# Patient Record
Sex: FEMALE | URBAN - METROPOLITAN AREA
[De-identification: names, ages, dates, MRNs, and addresses within clinical notes are randomized per-mention and may not be internally consistent; named-entity substitution may affect disease eponyms.]

---

## 2018-07-31 ENCOUNTER — OFFICE VISIT (OUTPATIENT)
Dept: URBAN - METROPOLITAN AREA CLINIC 23 | Facility: CLINIC | Age: 78
End: 2018-07-31
Payer: COMMERCIAL

## 2018-07-31 DIAGNOSIS — E11.9 TYPE 2 DIABETES MELLITUS W/O COMPLICATION: ICD-10-CM

## 2018-07-31 PROCEDURE — 99213 OFFICE O/P EST LOW 20 MIN: CPT | Performed by: OPHTHALMOLOGY

## 2018-07-31 PROCEDURE — 92134 CPTRZ OPH DX IMG PST SGM RTA: CPT | Performed by: OPHTHALMOLOGY

## 2018-07-31 ASSESSMENT — INTRAOCULAR PRESSURE
OD: 17
OS: 18

## 2018-07-31 NOTE — IMPRESSION/PLAN
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 OS. Condition: stable. Plan: Discussed diagnosis in detail with patient. No treatment is required at this time. Recommend pt to continue  AREDS 2 formula. Wear quality sunglasses and monitor vision at home with [address] [place] [address] Road. Call the office for an immediate appointment if [address] worsens. OCT performed today: no IRF or SRF OS- stable.

## 2018-07-31 NOTE — IMPRESSION/PLAN
Impression: Vitreous degeneration, bilateral: H43.813. OU. Condition: stable. Vision: vision not affected. Plan: Posterior vitreous detachment accounts for the patient's complaints. There is no evidence of retinal pathology. All signs and risks of retinal detachment and tears were discussed in detail. Patient instructed to call office immediately if any symptoms noted.

## 2018-07-31 NOTE — IMPRESSION/PLAN
Impression: Type 2 diabetes mellitus w/o complication: J96.4. OU. Condition: stable. Plan: Discussed diagnosis in detail with patient. No treatment is required at this time based on exam and OCT. Will continue to observe condition and or symptoms. Emphasized blood sugar control.

## 2018-07-31 NOTE — IMPRESSION/PLAN
Impression: Exdtve age-rel mclr degn, right eye, with inact chrdl neovas: H35.3212. OD. Condition: established, stable. Vision: vision affected. Last ROGER Tx OD 11/09/2017 w/ Dr Nereida Kaur Plan: Discussed diagnosis in detail with patient. No treatment is required at this time based on exam and OCT. Recommend observation for now. Will reassess condition in 4 months. OCT shows no IRF or SRF - stable See Dr Roth or Dr Kendra Lezama for lid eval - Elasticity

## 2018-08-21 ENCOUNTER — OFFICE VISIT (OUTPATIENT)
Dept: URBAN - METROPOLITAN AREA CLINIC 23 | Facility: CLINIC | Age: 78
End: 2018-08-21
Payer: COMMERCIAL

## 2018-08-21 PROCEDURE — 99213 OFFICE O/P EST LOW 20 MIN: CPT | Performed by: OPTOMETRIST

## 2018-08-21 RX ORDER — AZELASTINE HYDROCHLORIDE 0.5 MG/ML
0.05 % SOLUTION/ DROPS OPHTHALMIC
Qty: 1 | Refills: 11 | Status: INACTIVE
Start: 2018-08-21 | End: 2018-09-17

## 2018-08-21 RX ORDER — PREDNISOLONE ACETATE 10 MG/ML
1 % SUSPENSION/ DROPS OPHTHALMIC
Qty: 1 | Refills: 0 | Status: INACTIVE
Start: 2018-08-21 | End: 2018-08-27

## 2018-08-21 RX ORDER — NEOMYCIN, POLYMYXIN B SULFATES, DEXAMETHASONE 1; 3.5; 1 MG/G; MG/G; [USP'U]/G
OINTMENT OPHTHALMIC
Qty: 1 | Refills: 1 | Status: INACTIVE
Start: 2018-08-21 | End: 2018-09-17

## 2018-09-17 ENCOUNTER — OFFICE VISIT (OUTPATIENT)
Dept: URBAN - METROPOLITAN AREA CLINIC 23 | Facility: CLINIC | Age: 78
End: 2018-09-17
Payer: COMMERCIAL

## 2018-09-17 DIAGNOSIS — H10.45 OTHER CHRONIC ALLERGIC CONJUNCTIVITIS: Primary | ICD-10-CM

## 2018-09-17 DIAGNOSIS — H04.123 DRY EYE SYNDROME OF BILATERAL LACRIMAL GLANDS: ICD-10-CM

## 2018-09-17 PROCEDURE — 99213 OFFICE O/P EST LOW 20 MIN: CPT | Performed by: OPTOMETRIST

## 2018-09-17 NOTE — IMPRESSION/PLAN
Impression: Other chronic allergic conjunctivitis: H10.45 OU. Plan: Discussed condition with pt. Improving. Pt to continue lid hygene and using AT frequently.

## 2018-10-12 ENCOUNTER — OFFICE VISIT (OUTPATIENT)
Dept: URBAN - METROPOLITAN AREA CLINIC 23 | Facility: CLINIC | Age: 78
End: 2018-10-12
Payer: COMMERCIAL

## 2018-10-12 DIAGNOSIS — H16.223 KERATOCONJUNCT SICCA, NOT SPECIFIED AS SJOGREN'S, BILATERAL: Primary | ICD-10-CM

## 2018-10-12 PROCEDURE — 99212 OFFICE O/P EST SF 10 MIN: CPT | Performed by: OPTOMETRIST

## 2018-10-12 RX ORDER — LIFITEGRAST 50 MG/ML
5 % SOLUTION/ DROPS OPHTHALMIC
Qty: 60 | Refills: 3 | Status: ACTIVE
Start: 2018-10-12

## 2018-10-12 ASSESSMENT — KERATOMETRY
OD: 43.38
OS: 45.00

## 2018-10-12 ASSESSMENT — INTRAOCULAR PRESSURE
OS: 22
OD: 16

## 2018-10-12 NOTE — IMPRESSION/PLAN
Impression: Keratoconjunct sicca, not specified as Sjogren's, bilateral: P61.586. Plan: Examination findings were discussed. Dry eyes can be a chronic problem. Artificial tears are recommended throughout the day. Gel at bedtime may be helpful. Environmental factors can be modified.

## 2018-11-20 ENCOUNTER — OFFICE VISIT (OUTPATIENT)
Dept: URBAN - METROPOLITAN AREA CLINIC 23 | Facility: CLINIC | Age: 78
End: 2018-11-20
Payer: MEDICARE

## 2018-11-20 DIAGNOSIS — H35.3212 EXDTVE AGE-REL MCLR DEGN, RIGHT EYE, WITH INACT CHRDL NEOVAS: Primary | ICD-10-CM

## 2018-11-20 DIAGNOSIS — H35.3123 NEXDTVE AGE-REL MCLR DEGN, L EYE, ADV ATRPC W/O SBFVL INVL: ICD-10-CM

## 2018-11-20 DIAGNOSIS — H43.813 VITREOUS DEGENERATION, BILATERAL: ICD-10-CM

## 2018-11-20 PROCEDURE — 99213 OFFICE O/P EST LOW 20 MIN: CPT | Performed by: OPHTHALMOLOGY

## 2018-11-20 PROCEDURE — 92134 CPTRZ OPH DX IMG PST SGM RTA: CPT | Performed by: OPHTHALMOLOGY

## 2018-11-20 ASSESSMENT — INTRAOCULAR PRESSURE
OS: 16
OD: 15

## 2018-11-20 NOTE — IMPRESSION/PLAN
Impression: Type 2 diabetes mellitus w/o complication: S36.6. OU. Condition: stable. Plan: Discussed diagnosis in detail with patient. No treatment is required at this time based on exam and OCT. Will continue to observe condition and or symptoms. Emphasized blood sugar control.

## 2018-11-20 NOTE — IMPRESSION/PLAN
Impression: Exdtve age-rel mclr degn, right eye, with inact chrdl neovas: H35.3212. OD. Condition: established, stable. Vision: vision affected. Last ROGER Tx OD 11/09/2017 w/ Dr Hai Pastrana Plan: Discussed diagnosis in detail with patient. No treatment is required at this time based on exam and OCT. Recommend observation for now. Will reassess condition in 4 months. OCT shows no IRF or SRF - stable Recommend patient schedule appointment with Dr. Tania Harper for [address] [place] [address]

## 2018-11-20 NOTE — IMPRESSION/PLAN
Impression: Nexdtve age-rel mclr degn, l eye, adv atrpc w/o sbfvl invl: H37.0552. OS. Condition: stable. Plan: Discussed diagnosis in detail with patient. No treatment is required at this time. Recommend pt to continue  AREDS 2 formula. Wear quality sunglasses and monitor vision at home with [address] [place] [address] Road. Call the office for an immediate appointment if [address] worsens. OCT performed today: no IRF or SRF OS- stable.

## 2019-03-18 ENCOUNTER — OFFICE VISIT (OUTPATIENT)
Dept: URBAN - METROPOLITAN AREA CLINIC 23 | Facility: CLINIC | Age: 79
End: 2019-03-18
Payer: MEDICARE

## 2019-03-18 PROCEDURE — 99213 OFFICE O/P EST LOW 20 MIN: CPT | Performed by: OPHTHALMOLOGY

## 2019-03-18 PROCEDURE — 92134 CPTRZ OPH DX IMG PST SGM RTA: CPT | Performed by: OPHTHALMOLOGY

## 2019-03-18 ASSESSMENT — INTRAOCULAR PRESSURE
OS: 15
OD: 14

## 2019-03-18 NOTE — IMPRESSION/PLAN
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 OS. Condition: stable. Plan: Discussed diagnosis in detail with patient. No treatment is required at this time. Recommend pt to continue  AREDS 2 formula. Wear quality sunglasses and monitor vision at home with [address] [place] [address] Road. Call the office for an immediate appointment if [address] worsens. OCT performed today: no IRF or SRF OS - stable.

## 2019-03-18 NOTE — IMPRESSION/PLAN
Impression: Type 2 diabetes mellitus w/o complication: M83.2. OU. Condition: stable. Plan: Discussed diagnosis in detail with patient. No treatment is required at this time based on exam and OCT. Will continue to observe condition and or symptoms. Emphasized blood sugar control.

## 2019-03-18 NOTE — IMPRESSION/PLAN
Impression: Exdtve age-rel mclr degn, right eye, with inact chrdl neovas: H35.3212. OD. Condition: established, stable. Vision: vision affected. Last ROGER Tx OD 11/09/2017 w/ Dr Simón Lamar Plan: Discussed diagnosis in detail with patient. No treatment is required at this time based on exam and OCT. Recommend observation for now. Will reassess condition in 4 months. OCT shows no IRF or SRF - stable.

## 2019-08-21 ENCOUNTER — OFFICE VISIT (OUTPATIENT)
Dept: URBAN - METROPOLITAN AREA CLINIC 23 | Facility: CLINIC | Age: 79
End: 2019-08-21
Payer: MEDICARE

## 2019-08-21 PROCEDURE — 99213 OFFICE O/P EST LOW 20 MIN: CPT | Performed by: OPHTHALMOLOGY

## 2019-08-21 PROCEDURE — 92134 CPTRZ OPH DX IMG PST SGM RTA: CPT | Performed by: OPHTHALMOLOGY

## 2019-08-21 ASSESSMENT — INTRAOCULAR PRESSURE
OS: 23
OD: 16

## 2019-08-21 NOTE — IMPRESSION/PLAN
Impression: Exdtve age-rel mclr degn, right eye, with inact chrdl neovas: H35.3212. OD. Condition: established, stable. Vision: vision affected. Last ROGER Tx OD 11/09/2017 w/ Dr Esteban Valverde Plan: Discussed diagnosis in detail with patient. No treatment is required at this time based on exam and OCT. Recommend observation for now. Will reassess condition in 5 months.  OCT shows no change from last scan 3/18/19

## 2019-08-21 NOTE — IMPRESSION/PLAN
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 OS. Condition: stable. Plan: Discussed diagnosis in detail with patient. No treatment is required at this time. Recommend pt to continue  AREDS 2 formula. Wear quality sunglasses and monitor vision at home with [address] [place] [address] Road. Call the office for an immediate appointment if [address] worsens. OCT performed today: no IRF or SRF OS - stable.

## 2019-08-21 NOTE — IMPRESSION/PLAN
Impression: Type 2 diabetes mellitus w/o complication: Q38.4. OU. Condition: stable. Plan: Discussed diagnosis in detail with patient. No treatment is required at this time based on exam and OCT. Will continue to observe condition and or symptoms. Emphasized blood sugar control.

## 2020-02-21 ENCOUNTER — OFFICE VISIT (OUTPATIENT)
Dept: URBAN - METROPOLITAN AREA CLINIC 23 | Facility: CLINIC | Age: 80
End: 2020-02-21
Payer: MEDICARE

## 2020-02-21 PROCEDURE — 92134 CPTRZ OPH DX IMG PST SGM RTA: CPT | Performed by: OPHTHALMOLOGY

## 2020-02-21 PROCEDURE — 99213 OFFICE O/P EST LOW 20 MIN: CPT | Performed by: OPHTHALMOLOGY

## 2020-02-21 ASSESSMENT — INTRAOCULAR PRESSURE
OS: 16
OD: 16

## 2020-02-21 NOTE — IMPRESSION/PLAN
Impression: Nexdtve age-rel mclr degn, l eye, adv atrpc w/o sbfvl invl: H31.4551. OS. Condition: stable. Plan: Discussed diagnosis in detail with patient. No treatment is required at this time. Recommend pt to continue  AREDS 2 formula. Wear quality sunglasses and monitor vision at home with [address] [place] [address] Road. Call the office for an immediate appointment if [address] worsens. OCT performed today: no IRF or SRF OS - stable.

## 2020-02-21 NOTE — IMPRESSION/PLAN
Impression: Exdtve age-rel mclr degn, right eye, with inact chrdl neovas: H35.3212. OD. Condition: established, stable. Vision: vision affected. Last ROGER Tx OD 11/09/2017 w/ Dr Jaelyn Cohen Plan: Discussed diagnosis in detail with patient. No treatment is required at this time based on exam and OCT. Recommend observation for now. Will reassess condition in 6 months.  OCT shows no active leakage

## 2020-08-13 ENCOUNTER — OFFICE VISIT (OUTPATIENT)
Dept: URBAN - METROPOLITAN AREA CLINIC 23 | Facility: CLINIC | Age: 80
End: 2020-08-13
Payer: MEDICARE

## 2020-08-13 PROCEDURE — 99213 OFFICE O/P EST LOW 20 MIN: CPT | Performed by: OPHTHALMOLOGY

## 2020-08-13 ASSESSMENT — INTRAOCULAR PRESSURE
OD: 18
OS: 18

## 2020-08-13 NOTE — IMPRESSION/PLAN
Impression: Nexdtve age-rel mclr degn, l eye, adv atrpc w/o sbfvl invl: U16.7958. OS. Condition: stable. Plan: Due to Coronavirus COVID-19 pandemic and National Emergency, deferred Slit Lamp examination. Findings are based on OCT and Optos. OCT shows stable no obvious edema centrally OS and Optos shows Stable OS. No treatment is require at this time. Recommend a retina follow - up in 6 mos.

## 2020-08-13 NOTE — IMPRESSION/PLAN
Impression: Exdtve age-rel mclr degn, right eye, with inact chrdl neovas: H35.3212. OD. Condition: established, stable. Vision: vision affected. Last ROGER Tx OD 11/09/2017 w/ Dr Shirl Spatz Plan: Due to Coronavirus COVID-19 pandemic and National Emergency, deferred Slit Lamp examination. Findings are based on OCT and Optos. OCT shows stable no obvious edema centrally OD and Optos shows Stable OD. No treatment is require at this time. Recommend a retina follow - up in 6 mos.

## 2021-02-03 ENCOUNTER — OFFICE VISIT (OUTPATIENT)
Dept: URBAN - METROPOLITAN AREA CLINIC 32 | Facility: CLINIC | Age: 81
End: 2021-02-03
Payer: MEDICARE

## 2021-02-03 DIAGNOSIS — H52.4 PRESBYOPIA: ICD-10-CM

## 2021-02-03 PROCEDURE — 99214 OFFICE O/P EST MOD 30 MIN: CPT | Performed by: OPTOMETRIST

## 2021-02-03 ASSESSMENT — INTRAOCULAR PRESSURE
OD: 17
OS: 17

## 2021-02-03 ASSESSMENT — VISUAL ACUITY
OD: CF
OS: 20/50

## 2021-02-03 NOTE — IMPRESSION/PLAN
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 OS. Condition: stable. Plan: Due to Coronavirus COVID-19 pandemic and National Emergency, deferred Slit Lamp examination. Findings are based on OCT and Optos. OCT shows stable no obvious edema centrally OS and Optos shows Stable OS. Recommend: Criselda 4x Hand Magnifier, [employer], RAKESH Lights(often available at [employer] or [employer]), Large TV 72'' or bigger, Audible Books Discussed: ORCAM, iPhone/iPad, Audible Assistants([employer] Home)

## 2021-02-25 ENCOUNTER — OFFICE VISIT (OUTPATIENT)
Dept: URBAN - METROPOLITAN AREA CLINIC 23 | Facility: CLINIC | Age: 81
End: 2021-02-25
Payer: MEDICARE

## 2021-02-25 PROCEDURE — 99213 OFFICE O/P EST LOW 20 MIN: CPT | Performed by: OPHTHALMOLOGY

## 2021-02-25 PROCEDURE — 92134 CPTRZ OPH DX IMG PST SGM RTA: CPT | Performed by: OPHTHALMOLOGY

## 2021-02-25 ASSESSMENT — INTRAOCULAR PRESSURE
OS: 18
OD: 16

## 2021-02-25 NOTE — IMPRESSION/PLAN
Impression: Nexdtve age-rel mclr degn, l eye, adv atrpc w/o sbfvl invl: H32.2069. OS. Condition: stable. Vision: vision affected. Plan: Due to Coronavirus COVID-19 pandemic and National Emergency, deferred Slit Lamp examination. Findings are based on OCT and Optos. OCT OS shows stable no IRF/SRF and Optos OS confirms these findings. No treatment is required at this time. Recommend a retina follow - up in 6 mos, sooner if there is a sudden change in vision.

## 2021-02-25 NOTE — IMPRESSION/PLAN
Impression: Exdtve age-rel mclr degn, right eye, with inact chrdl neovas: H35.3212. OD. Condition: established, stable. Vision: vision affected. Last ROGER Tx OD 11/09/2017 w/ Dr Chatterjee Needs Plan: Due to Coronavirus COVID-19 pandemic and National Emergency, deferred Slit Lamp examination. Findings are based on OCT and Optos. OCT OD shows the macula is stable, no significant leakage and Optos OD confirms these findings. No treatment is required at this time. Recommend a retina follow - up in 6 mos. sooner if there is a sudden change in vision.

## 2021-09-17 ENCOUNTER — OFFICE VISIT (OUTPATIENT)
Dept: URBAN - METROPOLITAN AREA CLINIC 23 | Facility: CLINIC | Age: 81
End: 2021-09-17
Payer: COMMERCIAL

## 2021-09-17 PROCEDURE — 92134 CPTRZ OPH DX IMG PST SGM RTA: CPT | Performed by: OPHTHALMOLOGY

## 2021-09-17 PROCEDURE — 99213 OFFICE O/P EST LOW 20 MIN: CPT | Performed by: OPHTHALMOLOGY

## 2021-09-17 ASSESSMENT — INTRAOCULAR PRESSURE
OD: 16
OS: 17

## 2021-09-17 NOTE — IMPRESSION/PLAN
Impression: Nexdtve age-rel mclr degn, l eye, adv atrpc w/o sbfvl invl: H32.1849. OS. Condition: stable. Vision: vision affected. Plan: Due to Coronavirus COVID-19 pandemic and National Emergency, deferred Slit Lamp examination. Findings are based on OCT and Optos. OCT OS shows stable no IRF/SRF and Optos OS confirms findings. No treatment is required at this time. Recommend a retina follow - up in 6 mos, sooner if there is a sudden change in vision.

## 2022-04-21 ENCOUNTER — OFFICE VISIT (OUTPATIENT)
Dept: URBAN - METROPOLITAN AREA CLINIC 23 | Facility: CLINIC | Age: 82
End: 2022-04-21
Payer: COMMERCIAL

## 2022-04-21 DIAGNOSIS — H35.3123 NEXDTVE AGE-REL MCLR DEGN, L EYE, ADV ATRPC W/O SBFVL INVL: ICD-10-CM

## 2022-04-21 DIAGNOSIS — H35.3212 EXUDATIVE AGE-RELATED MACULAR DEGENERATION, RIGHT EYE, WITH INACTIVE CHOROIDAL NEOVASCULARIZATION: Primary | ICD-10-CM

## 2022-04-21 PROCEDURE — 92134 CPTRZ OPH DX IMG PST SGM RTA: CPT | Performed by: OPHTHALMOLOGY

## 2022-04-21 PROCEDURE — 99213 OFFICE O/P EST LOW 20 MIN: CPT | Performed by: OPHTHALMOLOGY

## 2022-04-21 ASSESSMENT — INTRAOCULAR PRESSURE
OS: 15
OD: 16

## 2022-04-21 NOTE — IMPRESSION/PLAN
Impression: Exdtve age-rel mclr degn, right eye, with inact chrdl neovas: H35.3212. OD. Condition: established, stable. Vision: vision affected. Last ROGER Tx OD 11/09/2017 w/ Dr Shayy Aragon Plan: Discussed diagnosis in detail with patient. No treatment is required at this time based on exam and diagnostic tests. Recommend observation for now. Will reassess condition in 12 mos, sooner if there is a change or decrease in vision. OCT and Optos OD shows no leakage - stable. Recommend LVE w/Dr Bob Keita.

## 2022-04-21 NOTE — IMPRESSION/PLAN
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 OS. Condition: stable. Vision: vision affected. Plan: Discussed diagnosis in detail with patient. No treatment is required at this time based on exam and diagnostic tests. Recommend observation for now. Will reassess condition in 12 mos, sooner if there is a change or decrease in vision. OCT and Optos OS shows no IRF/SRF - stable. Recommend LVE w/Dr Sherin Brumfield.

## 2022-06-28 ENCOUNTER — OFFICE VISIT (OUTPATIENT)
Dept: URBAN - METROPOLITAN AREA CLINIC 32 | Facility: CLINIC | Age: 82
End: 2022-06-28
Payer: COMMERCIAL

## 2022-06-28 DIAGNOSIS — H35.3123 NEXDTVE AGE-REL MCLR DEGN, L EYE, ADV ATRPC W/O SBFVL INVL: Primary | ICD-10-CM

## 2022-06-28 DIAGNOSIS — H52.4 PRESBYOPIA: ICD-10-CM

## 2022-06-28 PROCEDURE — 92015 DETERMINE REFRACTIVE STATE: CPT | Performed by: OPTOMETRIST

## 2022-06-28 PROCEDURE — 99213 OFFICE O/P EST LOW 20 MIN: CPT | Performed by: OPTOMETRIST

## 2022-06-28 ASSESSMENT — INTRAOCULAR PRESSURE
OS: 13
OD: 12

## 2022-06-28 ASSESSMENT — VISUAL ACUITY: OS: 20/200

## 2022-06-28 NOTE — IMPRESSION/PLAN
Impression: Presbyopia: H52.4. Plan: Finalized new glasses Rx. Patient education on appropriate options of eye glasses. Return to clinic in 1 year for complete eye exam and refraction. gave high add bifocal RX. Recommend ADL consider magnifiers if need Recommend: Criselda 4x Hand Magnifier, [employer], RAKESH Lights(often available at [employer] or [employer]), Large TV 65'' to 82,'' Audible Books, MaxTV[employer] Discussed: [employer]CAM, iPhone/iPad, Audible Assistants([employer])

## 2022-06-28 NOTE — IMPRESSION/PLAN
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 OS. Condition: stable. Vision: vision affected. Plan: Discussed diagnosis in detail with patient. No treatment is required at this time based on exam and diagnostic tests.  Continue care with Dr. Yung Schmidt